# Patient Record
Sex: MALE | Race: WHITE | NOT HISPANIC OR LATINO | Employment: FULL TIME | URBAN - METROPOLITAN AREA
[De-identification: names, ages, dates, MRNs, and addresses within clinical notes are randomized per-mention and may not be internally consistent; named-entity substitution may affect disease eponyms.]

---

## 2018-02-07 ENCOUNTER — OFFICE VISIT (OUTPATIENT)
Dept: SPORTS MEDICINE | Facility: CLINIC | Age: 28
End: 2018-02-07

## 2018-02-07 VITALS
WEIGHT: 141 LBS | HEIGHT: 68 IN | SYSTOLIC BLOOD PRESSURE: 122 MMHG | BODY MASS INDEX: 21.37 KG/M2 | DIASTOLIC BLOOD PRESSURE: 78 MMHG

## 2018-02-07 DIAGNOSIS — M79.671 RIGHT FOOT PAIN: Primary | ICD-10-CM

## 2018-02-07 PROCEDURE — 73630 X-RAY EXAM OF FOOT: CPT | Performed by: FAMILY MEDICINE

## 2018-02-07 PROCEDURE — 99204 OFFICE O/P NEW MOD 45 MIN: CPT | Performed by: FAMILY MEDICINE

## 2018-02-07 RX ORDER — LISINOPRIL 10 MG/1
10 TABLET ORAL DAILY
COMMUNITY

## 2018-02-07 NOTE — PROGRESS NOTES
"Javan is a 27 y.o. year old male    Chief Complaint   Patient presents with   • Foot Injury     Right       History of Present Illness  HPI   Distance runner, noticed pain in R foot Saturday (4 days ago) at the end of a long run. Has been present since, dull, mild, worse with weight bearing. No injury, has been gradually increasing his mileage (experienced runner).  History of 1 prior stress fracture in the left foot about 10 years ago.    I have reviewed the patient's medical, family, and social history in detail and updated the computerized patient record.    Review of Systems   Constitutional: Negative for fever.   Skin: Negative for wound.   Neurological: Negative for numbness.   All other systems reviewed and are negative.      /78  Ht 172.7 cm (68\")  Wt 64 kg (141 lb)  BMI 21.44 kg/m2     Physical Exam    Vital signs reviewed.   General: No acute distress.  Eyes: conjunctiva clear; pupils equally round and reactive  ENT: external ears and nose atraumatic; oropharynx clear  CV: no peripheral edema, 2+ distal pulses  Resp: normal respiratory effort, no use of accessory muscles  Skin: no rashes or wounds; normal turgor  Psych: mood and affect appropriate; recent and remote memory intact  Neuro: sensation to light touch intact    MS[facility] Exam:  Ortho Exam  Right foot: Normal appearance.  I was not able to elicit any specific tenderness to palpation, although he points to the distal shafts of the third and fourth metatarsals as the location of pain.  Normal range of motion, strength, stability.    Right Foot X-Ray  Indication: Pain  AP, Lateral, and Oblique views    Findings:  No fracture  No bony lesion  Normal soft tissues  Normal joint spaces    No prior studies were available for comparison.        Diagnoses and all orders for this visit:    Right foot pain  -     XR Foot 3+ View Right    Other orders  -     lisinopril (PRINIVIL,ZESTRIL) 10 MG tablet; Take 10 mg by mouth Daily.    Discussed treatment " considerations.  This could be a very early stress reaction that is not yet visible on x-ray, or could be more soft tissue in nature.  I gave him the option to evaluate further with MRI or empirically treat with rest from running and observation.  He chooses to rest for now, if he has not resolved and return to running within 3 weeks' time he will follow-up for further evaluation.    EMR Dragon/Transcription disclaimer:    Much of this encounter note is an electronic transcription/translation of spoken language to printed text.  The electronic translation of spoken language may permit erroneous, or at times, nonsensical words or phrases to be inadvertently transcribed.  Although I have reviewed the note for such errors some may still exist.

## 2021-04-16 ENCOUNTER — BULK ORDERING (OUTPATIENT)
Dept: CASE MANAGEMENT | Facility: OTHER | Age: 31
End: 2021-04-16

## 2021-04-16 DIAGNOSIS — Z23 IMMUNIZATION DUE: ICD-10-CM
